# Patient Record
Sex: MALE | Race: WHITE | NOT HISPANIC OR LATINO | Employment: FULL TIME | URBAN - METROPOLITAN AREA
[De-identification: names, ages, dates, MRNs, and addresses within clinical notes are randomized per-mention and may not be internally consistent; named-entity substitution may affect disease eponyms.]

---

## 2023-03-22 ENCOUNTER — OFFICE VISIT (OUTPATIENT)
Dept: PRIMARY CARE CLINIC | Facility: CLINIC | Age: 43
End: 2023-03-22
Payer: COMMERCIAL

## 2023-03-22 VITALS
WEIGHT: 212.94 LBS | HEART RATE: 88 BPM | DIASTOLIC BLOOD PRESSURE: 64 MMHG | HEIGHT: 74 IN | RESPIRATION RATE: 16 BRPM | BODY MASS INDEX: 27.33 KG/M2 | SYSTOLIC BLOOD PRESSURE: 102 MMHG | TEMPERATURE: 98 F | OXYGEN SATURATION: 98 %

## 2023-03-22 DIAGNOSIS — Z11.4 ENCOUNTER FOR SCREENING FOR HIV: ICD-10-CM

## 2023-03-22 DIAGNOSIS — Z13.220 SCREENING CHOLESTEROL LEVEL: ICD-10-CM

## 2023-03-22 DIAGNOSIS — Z13.1 SCREENING FOR DIABETES MELLITUS (DM): ICD-10-CM

## 2023-03-22 DIAGNOSIS — Z13.6 SCREENING FOR CARDIOVASCULAR CONDITION: ICD-10-CM

## 2023-03-22 DIAGNOSIS — Z76.89 ENCOUNTER TO ESTABLISH CARE: Primary | ICD-10-CM

## 2023-03-22 DIAGNOSIS — Z11.59 NEED FOR HEPATITIS C SCREENING TEST: ICD-10-CM

## 2023-03-22 DIAGNOSIS — M54.50 ACUTE MIDLINE LOW BACK PAIN WITHOUT SCIATICA: ICD-10-CM

## 2023-03-22 DIAGNOSIS — M54.50 LUMBAR BACK PAIN: ICD-10-CM

## 2023-03-22 PROCEDURE — 1159F MED LIST DOCD IN RCRD: CPT | Mod: CPTII,S$GLB,, | Performed by: STUDENT IN AN ORGANIZED HEALTH CARE EDUCATION/TRAINING PROGRAM

## 2023-03-22 PROCEDURE — 99214 PR OFFICE/OUTPT VISIT, EST, LEVL IV, 30-39 MIN: ICD-10-PCS | Mod: S$GLB,,, | Performed by: STUDENT IN AN ORGANIZED HEALTH CARE EDUCATION/TRAINING PROGRAM

## 2023-03-22 PROCEDURE — 3074F PR MOST RECENT SYSTOLIC BLOOD PRESSURE < 130 MM HG: ICD-10-PCS | Mod: CPTII,S$GLB,, | Performed by: STUDENT IN AN ORGANIZED HEALTH CARE EDUCATION/TRAINING PROGRAM

## 2023-03-22 PROCEDURE — 3074F SYST BP LT 130 MM HG: CPT | Mod: CPTII,S$GLB,, | Performed by: STUDENT IN AN ORGANIZED HEALTH CARE EDUCATION/TRAINING PROGRAM

## 2023-03-22 PROCEDURE — 3078F PR MOST RECENT DIASTOLIC BLOOD PRESSURE < 80 MM HG: ICD-10-PCS | Mod: CPTII,S$GLB,, | Performed by: STUDENT IN AN ORGANIZED HEALTH CARE EDUCATION/TRAINING PROGRAM

## 2023-03-22 PROCEDURE — 3078F DIAST BP <80 MM HG: CPT | Mod: CPTII,S$GLB,, | Performed by: STUDENT IN AN ORGANIZED HEALTH CARE EDUCATION/TRAINING PROGRAM

## 2023-03-22 PROCEDURE — 3008F BODY MASS INDEX DOCD: CPT | Mod: CPTII,S$GLB,, | Performed by: STUDENT IN AN ORGANIZED HEALTH CARE EDUCATION/TRAINING PROGRAM

## 2023-03-22 PROCEDURE — 99999 PR PBB SHADOW E&M-NEW PATIENT-LVL V: ICD-10-PCS | Mod: PBBFAC,,, | Performed by: STUDENT IN AN ORGANIZED HEALTH CARE EDUCATION/TRAINING PROGRAM

## 2023-03-22 PROCEDURE — 99999 PR PBB SHADOW E&M-NEW PATIENT-LVL V: CPT | Mod: PBBFAC,,, | Performed by: STUDENT IN AN ORGANIZED HEALTH CARE EDUCATION/TRAINING PROGRAM

## 2023-03-22 PROCEDURE — 3008F PR BODY MASS INDEX (BMI) DOCUMENTED: ICD-10-PCS | Mod: CPTII,S$GLB,, | Performed by: STUDENT IN AN ORGANIZED HEALTH CARE EDUCATION/TRAINING PROGRAM

## 2023-03-22 PROCEDURE — 99214 OFFICE O/P EST MOD 30 MIN: CPT | Mod: S$GLB,,, | Performed by: STUDENT IN AN ORGANIZED HEALTH CARE EDUCATION/TRAINING PROGRAM

## 2023-03-22 PROCEDURE — 1159F PR MEDICATION LIST DOCUMENTED IN MEDICAL RECORD: ICD-10-PCS | Mod: CPTII,S$GLB,, | Performed by: STUDENT IN AN ORGANIZED HEALTH CARE EDUCATION/TRAINING PROGRAM

## 2023-03-22 PROCEDURE — 1160F RVW MEDS BY RX/DR IN RCRD: CPT | Mod: CPTII,S$GLB,, | Performed by: STUDENT IN AN ORGANIZED HEALTH CARE EDUCATION/TRAINING PROGRAM

## 2023-03-22 PROCEDURE — 1160F PR REVIEW ALL MEDS BY PRESCRIBER/CLIN PHARMACIST DOCUMENTED: ICD-10-PCS | Mod: CPTII,S$GLB,, | Performed by: STUDENT IN AN ORGANIZED HEALTH CARE EDUCATION/TRAINING PROGRAM

## 2023-03-22 RX ORDER — MELOXICAM 15 MG/1
TABLET ORAL
Qty: 30 TABLET | Refills: 0 | Status: SHIPPED | OUTPATIENT
Start: 2023-03-22

## 2023-03-22 RX ORDER — MIRTAZAPINE 30 MG/1
30 TABLET, FILM COATED ORAL NIGHTLY PRN
COMMUNITY
Start: 2023-03-14

## 2023-03-22 RX ORDER — CLONAZEPAM 1 MG/1
1 TABLET ORAL 2 TIMES DAILY
COMMUNITY
Start: 2023-03-14

## 2023-03-22 RX ORDER — PREDNISONE 10 MG/1
TABLET ORAL
Qty: 11 TABLET | Refills: 0 | Status: SHIPPED | OUTPATIENT
Start: 2023-03-22

## 2023-03-22 RX ORDER — DESVENLAFAXINE SUCCINATE 50 MG/1
50 TABLET, EXTENDED RELEASE ORAL
COMMUNITY
Start: 2023-03-14

## 2023-03-22 NOTE — PROGRESS NOTES
Subjective:           Patient ID: Zana Ivan is a 43 y.o. male who presents today with a chief complaint of establish care.    Chief Complaint:   Establish Care, Low-back Pain, and Abdominal Pain (Occasional )      History of Present Illness:    43-year-old male presenting to [facility] care.  Reporting low back pain and abdominal pain occasionally over last 3 weeks.    Psych:  Hx of diagnosis for Depression, Bipolar 1 and Anixety.  Sees Gerardo Carlson at [facility] on the [facility].   Also taking Remeron 30mg nightly for sleep.  Taking Risperidone 3mg BID and Pristiq 50mg daily.  Takes the Clonazepam 1mg BID as needed anxiety used PRN.    Has rx for Medical Marijuana.  Is through other provider.     Denies any major concerns on health, just from some mental health concerns.     Lumbar Back Pain:  Has pain to lower back on the left side.  Moves to right side. Has not been shooting down the leg.     Has used ibuprofen, tylenol.    Sees chiropracter.  Uses tens machine at home has seen some possible relief.  Has gotten images with chiropractor.     No constipation.  No change to urination.     States had restless movements of legs but gets at least 8 hours of sleep a night.     Review of Systems   Constitutional: Negative.  Negative for fatigue and fever.   HENT: Negative.  Negative for rhinorrhea, sinus pressure, sinus pain and voice change.    Eyes: Negative.  Negative for visual disturbance.   Respiratory: Negative.  Negative for cough, choking and wheezing.    Cardiovascular: Negative.  Negative for chest pain, palpitations and leg swelling.   Gastrointestinal:  Positive for abdominal pain. Negative for constipation, diarrhea, nausea and vomiting.   Endocrine: Negative.    Genitourinary: Negative.  Negative for difficulty urinating, frequency and urgency.   Musculoskeletal:  Positive for arthralgias and back pain. Negative for myalgias.   Skin: Negative.    Allergic/Immunologic: Negative  "for food allergies.   Neurological:  Negative for weakness and headaches.   Psychiatric/Behavioral: Negative.  Negative for sleep disturbance.          Objective:        Vitals:    03/22/23 1319 03/22/23 1436   BP: (!) 100/58 102/64   BP Location: Right arm Left arm   Patient Position: Sitting Sitting   BP Method: Medium (Manual) Medium (Manual)   Pulse: 88    Resp: 16    Temp: 98.4 °F (36.9 °C)    TempSrc: Temporal    SpO2: 98%    Weight: 96.6 kg (212 lb 15.4 oz)    Height: 6' 2" (1.88 m)        Body mass index is 27.34 kg/m².      Physical Exam  Vitals reviewed.   Constitutional:       General: He is not in acute distress.     Appearance: Normal appearance. He is not ill-appearing.      Comments: As per BMI.   HENT:      Head: Normocephalic and atraumatic.      Right Ear: External ear normal.      Left Ear: External ear normal.      Nose: Nose normal.   Eyes:      Extraocular Movements: Extraocular movements intact.      Conjunctiva/sclera: Conjunctivae normal.   Cardiovascular:      Rate and Rhythm: Normal rate and regular rhythm.      Pulses: Normal pulses.      Heart sounds: No murmur heard.  Pulmonary:      Effort: Pulmonary effort is normal. No respiratory distress.      Breath sounds: No wheezing.   Abdominal:      Tenderness: There is no right CVA tenderness or left CVA tenderness.   Musculoskeletal:         General: Tenderness present. No swelling or deformity.      Cervical back: Normal range of motion.      Right lower leg: No edema.      Left lower leg: No edema.   Skin:     Capillary Refill: Capillary refill takes less than 2 seconds.      Findings: No bruising or lesion.   Neurological:      General: No focal deficit present.      Mental Status: He is alert and oriented to person, place, and time.      Gait: Gait normal.   Psychiatric:         Mood and Affect: Mood normal.           Lab Results   Component Value Date     12/19/2019    K 3.8 12/19/2019     12/19/2019    CO2 28 12/19/2019    " BUN 22 (H) 12/19/2019    CREATININE 1.0 12/19/2019    ANIONGAP 7 (L) 12/19/2019     No results found for: HGBA1C  No results found for: BNP, BNPTRIAGEBLO    Lab Results   Component Value Date    WBC 6.40 12/19/2019    HGB 13.3 (L) 12/19/2019    HCT 40.1 12/19/2019     12/19/2019    GRAN 4.9 12/19/2019    GRAN 76.3 (H) 12/19/2019     No results found for: CHOL, HDL, LDLCALC, TRIG       Current Outpatient Medications:     clonazePAM (KLONOPIN) 1 MG tablet, Take 1 mg by mouth 2 (two) times daily., Disp: , Rfl:     PRISTIQ 50 mg Tb24, Take 50 mg by mouth., Disp: , Rfl:     REMERON 30 mg tablet, Take 30 mg by mouth nightly as needed., Disp: , Rfl:     risperiDONE (RISPERDAL) 3 MG Tab, Take 3 mg by mouth 2 (two) times daily., Disp: , Rfl:     meloxicam (MOBIC) 15 MG tablet, Take 1 tab daily with food for 2 weeks and then as needed., Disp: 30 tablet, Rfl: 0    predniSONE (DELTASONE) 10 MG tablet, Take 1 tab twice daily for 3 days, then 1 daily for 3 days, then 1/2 tab daily for 4 days., Disp: 11 tablet, Rfl: 0     Outpatient Encounter Medications as of 3/22/2023   Medication Sig Dispense Refill    clonazePAM (KLONOPIN) 1 MG tablet Take 1 mg by mouth 2 (two) times daily.      PRISTIQ 50 mg Tb24 Take 50 mg by mouth.      REMERON 30 mg tablet Take 30 mg by mouth nightly as needed.      risperiDONE (RISPERDAL) 3 MG Tab Take 3 mg by mouth 2 (two) times daily.      meloxicam (MOBIC) 15 MG tablet Take 1 tab daily with food for 2 weeks and then as needed. 30 tablet 0    predniSONE (DELTASONE) 10 MG tablet Take 1 tab twice daily for 3 days, then 1 daily for 3 days, then 1/2 tab daily for 4 days. 11 tablet 0    [DISCONTINUED] dextroamphetamine/amphetamine (MYDAYIS ORAL) Take by mouth.      [DISCONTINUED] escitalopram oxalate (LEXAPRO) 10 MG tablet Take 10 mg by mouth once daily.      [DISCONTINUED] etodolac (LODINE) 200 MG Cap Take 1 capsule (200 mg total) by mouth 3 (three) times daily. 30 capsule 0    [DISCONTINUED]  lisdexamfetamine (VYVANSE) 70 MG capsule Take 70 mg by mouth every morning.       No facility-administered encounter medications on file as of 3/22/2023.          Assessment:       1. Encounter to establish care    2. Acute midline low back pain without sciatica    3. Lumbar back pain    4. Screening cholesterol level    5. Need for hepatitis C screening test    6. Screening for cardiovascular condition    7. Screening for diabetes mellitus (DM)    8. Encounter for screening for HIV           Plan:       Encounter to establish care    Acute midline low back pain without sciatica    Lumbar back pain  -     predniSONE (DELTASONE) 10 MG tablet; Take 1 tab twice daily for 3 days, then 1 daily for 3 days, then 1/2 tab daily for 4 days.  Dispense: 11 tablet; Refill: 0  -     meloxicam (MOBIC) 15 MG tablet; Take 1 tab daily with food for 2 weeks and then as needed.  Dispense: 30 tablet; Refill: 0    Screening cholesterol level  -     Cancel: Lipid Panel; Future; Expected date: 03/22/2023  -     Lipid Panel; Future; Expected date: 03/22/2023    Need for hepatitis C screening test  -     Cancel: HEPATITIS C ANTIBODY; Future; Expected date: 03/22/2023  -     HEPATITIS C ANTIBODY; Future; Expected date: 03/22/2023    Screening for cardiovascular condition  -     Cancel: CBC Auto Differential; Future; Expected date: 03/22/2023  -     Cancel: Comprehensive Metabolic Panel; Future; Expected date: 03/22/2023  -     Cancel: Hemoglobin A1C; Future; Expected date: 03/22/2023  -     Cancel: TSH; Future; Expected date: 03/22/2023  -     Cancel: T4, Free; Future; Expected date: 03/22/2023  -     CBC Auto Differential; Future; Expected date: 03/22/2023  -     Comprehensive Metabolic Panel; Future; Expected date: 03/22/2023  -     Hemoglobin A1C; Future; Expected date: 03/22/2023  -     T4, Free; Future; Expected date: 03/22/2023  -     TSH; Future; Expected date: 03/22/2023    Screening for diabetes mellitus (DM)  -     Cancel:  Hemoglobin A1C; Future; Expected date: 03/22/2023  -     Hemoglobin A1C; Future; Expected date: 03/22/2023    Encounter for screening for HIV  -     Cancel: HIV 1/2 Ag/Ab (4th Gen); Future; Expected date: 03/22/2023  -     HIV 1/2 Ag/Ab (4th Gen); Future; Expected date: 03/22/2023               Est Care:   - presenting to establish care.  Has history of bipolar/anxiety.  Has been taking medications from Psychiatry to address these issues.  Would continue therapeutic relationship with those providers.    Lumbar Back Pain:   - has been seeing chiropracter. Has done adjustments, inversons.  Tried ibuprofen, tylenol.    - has used yourdelivery machine there.   - giving rx for Meloxicam and Flexeril.     Bipolar 1/depression/anxiety:    - Sees Gerardo Carlson at [facility] on the [facility].   - taking Remeron 30mg nightly for sleep.  - Taking Risperidone 3mg BID and Pristiq 50mg daily.  - Takes the Clonazepam 1mg BID as needed anxiety used PRN.

## 2023-03-22 NOTE — PATIENT INSTRUCTIONS
Est Care:   - presenting to establish care.  Has history of bipolar/anxiety.  Has been taking medications from Psychiatry to address these issues.  Would continue therapeutic relationship with those providers.    Lumbar Back Pain:   - has been seeing chiropracter. Has done adjustments, inversons.  Tried ibuprofen, tylenol.    - has used PANOSOL machine there.   - giving rx for Meloxicam and Flexeril.     Bipolar 1/depression/anxiety:    - [facility] [place] [facility]   - taking Remeron 30mg nightly for sleep.  - Taking Risperidone 3mg BID and Pristiq 50mg daily.  - Takes the Clonazepam 1mg BID as needed anxiety used PRN.

## 2023-03-24 LAB
ALBUMIN SERPL-MCNC: 5 G/DL (ref 3.6–5.1)
ALBUMIN/GLOB SERPL: 2.2 (CALC) (ref 1–2.5)
ALP SERPL-CCNC: 45 U/L (ref 36–130)
ALT SERPL-CCNC: 14 U/L (ref 9–46)
AST SERPL-CCNC: 10 U/L (ref 10–40)
BASOPHILS # BLD AUTO: 28 CELLS/UL (ref 0–200)
BASOPHILS NFR BLD AUTO: 0.4 %
BILIRUB SERPL-MCNC: 0.9 MG/DL (ref 0.2–1.2)
BUN SERPL-MCNC: 26 MG/DL (ref 7–25)
BUN/CREAT SERPL: 27 (CALC) (ref 6–22)
CALCIUM SERPL-MCNC: 9.7 MG/DL (ref 8.6–10.3)
CHLORIDE SERPL-SCNC: 106 MMOL/L (ref 98–110)
CHOLEST SERPL-MCNC: 160 MG/DL
CHOLEST/HDLC SERPL: 3.6 (CALC)
CO2 SERPL-SCNC: 25 MMOL/L (ref 20–32)
CREAT SERPL-MCNC: 0.98 MG/DL (ref 0.6–1.29)
EGFR: 99 ML/MIN/1.73M2
EOSINOPHIL # BLD AUTO: 77 CELLS/UL (ref 15–500)
EOSINOPHIL NFR BLD AUTO: 1.1 %
ERYTHROCYTE [DISTWIDTH] IN BLOOD BY AUTOMATED COUNT: 12.1 % (ref 11–15)
GLOBULIN SER CALC-MCNC: 2.3 G/DL (CALC) (ref 1.9–3.7)
GLUCOSE SERPL-MCNC: 84 MG/DL (ref 65–99)
HBA1C MFR BLD: 4.9 % OF TOTAL HGB
HCT VFR BLD AUTO: 40.6 % (ref 38.5–50)
HCV AB S/CO SERPL IA: <0.02
HCV AB SERPL QL IA: NORMAL
HDLC SERPL-MCNC: 44 MG/DL
HGB BLD-MCNC: 14.1 G/DL (ref 13.2–17.1)
HIV 1+2 AB+HIV1 P24 AG SERPL QL IA: NORMAL
LDLC SERPL CALC-MCNC: 99 MG/DL (CALC)
LYMPHOCYTES # BLD AUTO: 2443 CELLS/UL (ref 850–3900)
LYMPHOCYTES NFR BLD AUTO: 34.9 %
MCH RBC QN AUTO: 30.5 PG (ref 27–33)
MCHC RBC AUTO-ENTMCNC: 34.7 G/DL (ref 32–36)
MCV RBC AUTO: 87.9 FL (ref 80–100)
MONOCYTES # BLD AUTO: 483 CELLS/UL (ref 200–950)
MONOCYTES NFR BLD AUTO: 6.9 %
NEUTROPHILS # BLD AUTO: 3969 CELLS/UL (ref 1500–7800)
NEUTROPHILS NFR BLD AUTO: 56.7 %
NONHDLC SERPL-MCNC: 116 MG/DL (CALC)
PLATELET # BLD AUTO: 197 THOUSAND/UL (ref 140–400)
PMV BLD REES-ECKER: 11.6 FL (ref 7.5–12.5)
POTASSIUM SERPL-SCNC: 4 MMOL/L (ref 3.5–5.3)
PROT SERPL-MCNC: 7.3 G/DL (ref 6.1–8.1)
RBC # BLD AUTO: 4.62 MILLION/UL (ref 4.2–5.8)
SODIUM SERPL-SCNC: 142 MMOL/L (ref 135–146)
T4 FREE SERPL-MCNC: 0.9 NG/DL (ref 0.8–1.8)
TRIGL SERPL-MCNC: 78 MG/DL
TSH SERPL-ACNC: 0.34 MIU/L (ref 0.4–4.5)
WBC # BLD AUTO: 7 THOUSAND/UL (ref 3.8–10.8)

## 2023-04-03 ENCOUNTER — OCCUPATIONAL HEALTH (OUTPATIENT)
Dept: URGENT CARE | Facility: CLINIC | Age: 43
End: 2023-04-03
Payer: COMMERCIAL

## 2023-04-03 DIAGNOSIS — Z13.9 ENCOUNTER FOR SCREENING: Primary | ICD-10-CM

## 2023-04-03 LAB
COLLECTION ONLY: ABNORMAL
CTP QC/QA: YES
POC 5 PANEL DRUG SCREEN: ABNORMAL

## 2023-04-03 PROCEDURE — 80305 DRUG TEST PRSMV DIR OPT OBS: CPT | Mod: S$GLB,,, | Performed by: NURSE PRACTITIONER

## 2023-04-03 PROCEDURE — 80305 POCT RAPID DRUG SCREEN 5 PANEL: ICD-10-PCS | Mod: S$GLB,,, | Performed by: NURSE PRACTITIONER

## 2023-04-18 ENCOUNTER — OCCUPATIONAL HEALTH (OUTPATIENT)
Dept: URGENT CARE | Facility: CLINIC | Age: 43
End: 2023-04-18

## 2023-04-18 DIAGNOSIS — Z13.9 ENCOUNTER FOR SCREENING: Primary | ICD-10-CM

## 2023-04-18 PROCEDURE — 86580 TB INTRADERMAL TEST: CPT | Mod: S$GLB,,, | Performed by: NURSE PRACTITIONER

## 2023-04-18 PROCEDURE — 86580 POCT TB SKIN TEST: ICD-10-PCS | Mod: S$GLB,,, | Performed by: NURSE PRACTITIONER

## 2023-09-18 ENCOUNTER — PATIENT MESSAGE (OUTPATIENT)
Dept: PRIMARY CARE CLINIC | Facility: CLINIC | Age: 43
End: 2023-09-18
Payer: COMMERCIAL

## 2023-10-18 ENCOUNTER — PATIENT MESSAGE (OUTPATIENT)
Dept: CARDIOLOGY | Facility: CLINIC | Age: 43
End: 2023-10-18

## 2024-09-19 ENCOUNTER — PATIENT MESSAGE (OUTPATIENT)
Dept: PRIMARY CARE CLINIC | Facility: CLINIC | Age: 44
End: 2024-09-19